# Patient Record
Sex: FEMALE | URBAN - METROPOLITAN AREA
[De-identification: names, ages, dates, MRNs, and addresses within clinical notes are randomized per-mention and may not be internally consistent; named-entity substitution may affect disease eponyms.]

---

## 2017-12-31 ENCOUNTER — NURSE TRIAGE (OUTPATIENT)
Dept: ADMINISTRATIVE | Facility: CLINIC | Age: 62
End: 2017-12-31

## 2018-01-01 ENCOUNTER — NURSE TRIAGE (OUTPATIENT)
Dept: ADMINISTRATIVE | Facility: CLINIC | Age: 63
End: 2018-01-01

## 2018-01-01 NOTE — TELEPHONE ENCOUNTER
"    Reason for Disposition   Caller requesting a NON-URGENT new prescription or refill and triager unable to refill per unit policy    Answer Assessment - Initial Assessment Questions  1. SYMPTOMS: "Do you have any symptoms?"      Dx with flu  2. SEVERITY: If symptoms are present, ask "Are they mild, moderate or severe?"      Vomiting after taking medication    Protocols used: ST MEDICATION QUESTION CALL-A-    Patient states she was given a z-pack at [facility] as well as the tamiflu.  She is vomiting after each try of the tamiflu.  Patient has called abck to  who has informed her there is nothing else he could order.  Patient is calling to confirm.  Advised patient to follow the advice of the  physician.  "

## 2018-01-01 NOTE — TELEPHONE ENCOUNTER
"Patient stated that she went to an urgent care clinic yesterday and was diagnosed with Influenza. She was prescribed Tamiflu and throws up after taking it. Advised per protocol, instructed to go back to urgent care or ED and she verbalized understanding. Call back for further assistance or additional questions.    Reason for Disposition   Vomiting a prescription medication    Answer Assessment - Initial Assessment Questions  1. VOMITING SEVERITY: "How many times have you vomited in the past 24 hours?"      - MILD:  1 - 2 times/day     - MODERATE: 3 - 5 times/day, decreased oral intake without significant weight loss or symptoms of dehydration     - SEVERE: 6 or more times/day, vomits everything or nearly everything, with significant weight loss, symptoms of dehydration       2 in 24 hours  2. ONSET: "When did the vomiting begin?"       yesterday  3. FLUIDS: "What fluids or food have you vomited up today?" "Have you been able to keep any fluids down?"      Vomited after medication   4. ABDOMINAL PAIN: "Are your having any abdominal pain?" If yes : "How bad is it and what does it feel like?" (e.g., crampy, dull, intermittent, constant)       no  5. DIARRHEA: "Is there any diarrhea?" If so, ask: "How many times today?"       no  6. CONTACTS: "Is there anyone else in the family with the same symptoms?"       no  7. CAUSE: "What do you think is causing your vomiting?"      Influenza  8. HYDRATION STATUS: "Any signs of dehydration?" (e.g., dry mouth [not only dry lips], too weak to stand) "When did you last urinate?"      No; 1 hour ago  9. OTHER SYMPTOMS: "Do you have any other symptoms?" (e.g., fever, headache, vertigo, vomiting blood or coffee grounds)      no  10. PREGNANCY: "Is there any chance you are pregnant?" "When was your last menstrual period?"        N/A    Protocols used: ST VOMITING-A-      "